# Patient Record
Sex: FEMALE | Race: BLACK OR AFRICAN AMERICAN | NOT HISPANIC OR LATINO | Employment: PART TIME | URBAN - METROPOLITAN AREA
[De-identification: names, ages, dates, MRNs, and addresses within clinical notes are randomized per-mention and may not be internally consistent; named-entity substitution may affect disease eponyms.]

---

## 2024-09-26 ENCOUNTER — PRE VISIT (OUTPATIENT)
Dept: RADIATION ONCOLOGY | Facility: CLINIC | Age: 44
End: 2024-09-26
Payer: COMMERCIAL

## 2024-09-26 ENCOUNTER — TRANSFERRED RECORDS (OUTPATIENT)
Dept: HEALTH INFORMATION MANAGEMENT | Facility: CLINIC | Age: 44
End: 2024-09-26
Payer: COMMERCIAL

## 2024-09-26 NOTE — TELEPHONE ENCOUNTER
Action 2024 3:57 PM ABT   Action Taken Called [facility] [phone], [facility] for image request 24 MR Brain     MEDICAL RECORDS REQUEST   Radiation Oncology  [address] [facility] [place] [address]  Fax: [phone]        FUTURE VISIT INFORMATION                                                   Emilee Rehman, : 1980 scheduled for future visit at [facility] Radiation Oncology    RECORDS REQUESTED FOR VISIT                                                     BRAIN STATUS DETAILS   OFFICE NOTE from PCP     OFFICE NOTE from neuro oncologist     OFFICE NOTE from neuro/neuro surg Req -Maya Pardo   ED/HOSP ADMISSION NOTES     OPERATIVE/BIOPSY REPORTS     Angiogram Embolization Report/Audiogram Reports (Acoustic Neuroma Only)     MEDICATION LIST     LABS     PATHOLOGY REPORTS     ANYTHING RELATED TO DIAGNOSIS     IMAGING (NEED IMAGES & REPORT)     CT BRAIN/HEAD PACS [facility]:  24: MR & MRA Head/Brain   MRI BRAIN/HEAD     XRAYS     ULTRASOUND     PET     PREVIOUS RADIATION     WHAT HEALTHCARE FACILITY     RADIATION ONCOLOGIST NOTES     RADIATION TREATMENT SUMMARY NOTES      *[facility] and [facility] Radiation Oncology patients to [facility] with ATTN: ILEANA/Tyrese Dosi/Physics    *only [facility] patient's, use [facility]

## 2024-09-27 ENCOUNTER — TRANSCRIBE ORDERS (OUTPATIENT)
Dept: OTHER | Age: 44
End: 2024-09-27

## 2024-09-27 DIAGNOSIS — D32.9 MENINGIOMA (H): Primary | ICD-10-CM

## 2024-09-30 DIAGNOSIS — D32.9 MENINGIOMA (H): Primary | ICD-10-CM

## 2024-11-04 NOTE — PROGRESS NOTES
Department of Radiation Oncology  [address] [address]  [phone]       Consultation Note    Name: Emilee Rehman MRN: [id number]   : 1980   Date of Service: 2024 Referring: Dr. Pardo      Diagnosis: Likely meningioma, presumed WHO G1  Stage: 0    History of Present Illness   Ms. Rehman is a 44 year old female with CNVI palsy of the L eye in 2024, found to have a 1.2 x 1.6 x 0.8 cm enhancing mass involving the left cavernous sinus which encases the left cavernous ICA segment. The overall imaging appearance is most suggestive of a meningioma. This probably represents the cause of the patient's left abducens nerve palsy. On 24, she met with neurosurgery Dr. Pardo who recommended a stereotatic radiosurgery with Gamma-Knife to halt the growth of the tumor and prevent any further cranial nerve palsies. Pt is in agreement with this plan.     Oncologic History:  Chemotherapy History: None  Radiation History: None  Pregnant: No  Implanted Cardiac Devices: No  Autoimmune History: No    On interview, the patient is feeling fell overall. She has had CN VI palsy that is managed with prism. She is here today to discuss GK SRS.    Review of Systems   A 12-point review of systems was performed. Pertinent findings are noted in the HPI.    Physical Exam   ECOG Status: 0    VITALS: BP (!) 151/97   Pulse 84   SpO2 97%   GEN: Appears well, alert, oriented, and in NAD  NEURO: No focal deficits, CN 3-12 grossly intact except L CN VI, normal and symmetric motor and sensory exam in bilateral upper and lower extremities, normal gait  PSYCH: Appropriate mood and affect    Imaging/Path/Labs   Imaging:   HEAD MRI:   1.  There is a 1.2 x 1.6 x 0.8 cm enhancing mass involving the left cavernous sinus which encases the left cavernous ICA segment. The overall imaging appearance is most suggestive of a meningioma. This probably  represents the cause of the patient's left abducens nerve palsy.     HEAD MRA:   1.  There is no evidence of luminal narrowing involving the encased left cavernous ICA segment.     2.  There is an apparent 2 mm outpouching involving the left MCA bifurcation. It is unclear whether this represents a small saccular aneurysm or a focal area of vessel tortuosity. Head CTA may help in further evaluation.     Path: None    Labs: reviewed    Assessment    Ms. Rehman is a 44 year old female with a suspected meningioma, 1.2 x 1.6 x 0.8 cm enhancing mass involving the left cavernous sinus, to whom we recommend GK SRS with 13 gy in 1 fx, frame based approach, 50% isodose line, no GTV expansion.    Plan   I discussed the natural history of her disease, and available management options. I discussed the risks, benefits, alternatives, and logistics to radiation therapy in detail. I discussed the risks associated with brain RT, including (but not limited to) fatigue, headache, hair loss, skin reaction, radiation necrosis, neurologic deficits, and neurologic/cognitive decline. She understands the risks of radiation therapy may be severe and permanent.    The patient elected to proceed with radiation therapy treatment and informed consent was obtained. All questions answered.      Patient was seen and discussed with my attending physician, Dr. العراقي.    Sonya Curtis MD, MS PGY-3  Department of Radiation Oncology  [facility]  Phone: [phone]   meningioma to prevent further enlargement of the mass.  Patient understands that the expectation is that after treatment, the meningioma will remain the same size or slightly shrink as seen on subsequent MRI scans.  She understands that recovery of function of her left abducens nerve is unlikely after treatment.    We appreciate the opportunity to participate in 's care.  Please call with questions.    75 minutes spent by me on the date of the encounter doing chart review, history and exam, documentation and further activities per the note    Reema العراقي MD  Department of Radiation Oncology  [facility]    CC  Patient Care Team:  Clinic - [place] [facility] as PCP - Reema Ro MD as Assigned Cancer Care Provider  Nestor Pardo MD

## 2024-11-06 ENCOUNTER — OFFICE VISIT (OUTPATIENT)
Dept: RADIATION ONCOLOGY | Facility: CLINIC | Age: 44
End: 2024-11-06
Attending: RADIOLOGY
Payer: COMMERCIAL

## 2024-11-06 VITALS — SYSTOLIC BLOOD PRESSURE: 151 MMHG | HEART RATE: 84 BPM | DIASTOLIC BLOOD PRESSURE: 97 MMHG | OXYGEN SATURATION: 97 %

## 2024-11-06 DIAGNOSIS — D32.9 MENINGIOMA (H): Primary | ICD-10-CM

## 2024-11-06 LAB — HCG SERPL QL: NEGATIVE

## 2024-11-06 PROCEDURE — 99212 OFFICE O/P EST SF 10 MIN: CPT | Performed by: RADIOLOGY

## 2024-11-06 PROCEDURE — 84703 CHORIONIC GONADOTROPIN ASSAY: CPT | Performed by: RADIOLOGY

## 2024-11-06 RX ORDER — FERROUS SULFATE 325(65) MG
325 TABLET, DELAYED RELEASE (ENTERIC COATED) ORAL DAILY
COMMUNITY

## 2024-11-06 ASSESSMENT — ENCOUNTER SYMPTOMS
CARDIOVASCULAR NEGATIVE: 1
PSYCHIATRIC NEGATIVE: 1
GASTROINTESTINAL NEGATIVE: 1
MUSCULOSKELETAL NEGATIVE: 1
EYES NEGATIVE: 1
RESPIRATORY NEGATIVE: 1
CONSTITUTIONAL NEGATIVE: 1
NEUROLOGICAL NEGATIVE: 1

## 2024-11-06 NOTE — PROGRESS NOTES
HPI  Date: 2024   Age: 44 year old  Ethnicity:   Sex: female  : 1980   Lives In: [place], [place]      Diagnosis: Meningioma    Pain at time of consult? None  Does patient have a living will? Not yet  Is patient pregnant? No   Does patient have an implanted cardiac device? No     RN time with patient: 30 minutes  Educated on gamma knife? Yes     Fall Screen:  Have you fallen in the past week? No  Have you felt unsteady when walking or standing in the past week?  No     Physicians: Edvin/Malcom      Prior radiation therapy:   None    Prior chemotherapy:   None    Review Since Diagnosis:  Pt presented to [facility] Emergency Department for left eye that was deviating medially on 24.  Pt had been able to identify left eye issues since .  24 Brain MRI showing 1.2 x 1.6 x 0.8 cm enhancing mass involving the left cavernous sinus which encases the left cavernous ICA segment. The overall imaging appearance is most suggestive of a meningioma. This probably represents the cause of the patient's left abducens nerve palsy.   24 met with Dr. Pardo for further evaluation. Dr. Pardo recommended a stereotatic radiosurgery with Gamma-Knife to halt the growth of the tumor and prevent any further cranial nerve palsies. Pt is in agreement with this plan.      Chief Complaint: at     Review of Systems   Constitutional: Negative.    HENT: Negative.     Eyes: Negative.         Glasses help   Respiratory: Negative.     Cardiovascular: Negative.    Gastrointestinal: Negative.    Genitourinary: Negative.    Musculoskeletal: Negative.    Skin: Negative.    Neurological: Negative.    Endo/Heme/Allergies: Negative.    Psychiatric/Behavioral: Negative.

## 2024-11-06 NOTE — LETTER
2024      Emilee Rehman  [address]  [place] [address]      Dear Colleague,    Thank you for referring your patient, Emilee Rehman, to the [facility] RADIATION ONCOLOGY. Please see a copy of my visit note below.       Department of Radiation Oncology  [facility]  [address] [place] [address]  [phone]       Consultation Note    Name: Emilee Rehman MRN: [id number]   : 1980   Date of Service: 2024 Referring: Dr. Pardo      Diagnosis: Likely meningioma, presumed WHO G1  Stage: 0    History of Present Illness   Ms. Rehman is a 44 year old female with CNVI palsy of the L eye in 2024, found to have a 1.2 x 1.6 x 0.8 cm enhancing mass involving the left cavernous sinus which encases the left cavernous ICA segment. The overall imaging appearance is most suggestive of a meningioma. This probably represents the cause of the patient's left abducens nerve palsy. On 24, she met with neurosurgery Dr. Pardo who recommended a stereotatic radiosurgery with Gamma-Knife to halt the growth of the tumor and prevent any further cranial nerve palsies. Pt is in agreement with this plan.     Oncologic History:  Chemotherapy History: None  Radiation History: None  Pregnant: No  Implanted Cardiac Devices: No  Autoimmune History: No    On interview,***    Past Medical History:    Family History:    Review of Systems   A 12-point review of systems was performed. Pertinent findings are noted in the HPI.    Physical Exam   ECOG Status: {0-4:[id number]}    VITALS: There were no vitals taken for this visit.  GEN: Appears well, alert, oriented, and in NAD  HEENT: EOMI, normal conjunctiva, MMM  NECK: Supple, full ROM, no cervical or clavicular lymphadenopathy  CV: RRR, no murmurs/rubs/gallops, warm and well-perfused  RESP: CTAB, no wheezes/rales/rhonchi, breathing comfortably on room air  ABDOMEN: Soft, non-tender,  non-distended  : ***  SKIN: Normal color and turgor  NEURO: No focal deficits, CN 3-12 grossly intact, normal and symmetric motor and sensory exam in bilateral upper and lower extremities, normal gait  PSYCH: Appropriate mood and affect    Imaging/Path/Labs   Imaging:   HEAD MRI:   1.  There is a 1.2 x 1.6 x 0.8 cm enhancing mass involving the left cavernous sinus which encases the left cavernous ICA segment. The overall imaging appearance is most suggestive of a meningioma. This probably represents the cause of the patient's left abducens nerve palsy.     HEAD MRA:   1.  There is no evidence of luminal narrowing involving the encased left cavernous ICA segment.     2.  There is an apparent 2 mm outpouching involving the left MCA bifurcation. It is unclear whether this represents a small saccular aneurysm or a focal area of vessel tortuosity. Head CTA may help in further evaluation.     Path: None    Labs: reviewed    Assessment    Ms. Rehman is a 44 year old female with a suspected meningioma, 1.2 x 1.6 x 0.8 cm enhancing mass involving the left cavernous sinus, to whom we recommend GK SRS with 13 gy in 1 fx, frame based approach, 50% isodose line, no GTV expansion.    Plan   I discussed the natural history of her disease, and available management options. I discussed the risks, benefits, alternatives, and logistics to radiation therapy in detail. I discussed the risks associated with brain RT, including (but not limited to) fatigue, headache, hair loss, skin reaction, radiation necrosis, neurologic deficits, and neurologic/cognitive decline. She understands the risks of radiation therapy may be severe and permanent.    The patient elected to proceed with radiation therapy treatment and informed consent was obtained. All questions answered.      Patient was seen and discussed with my attending physician, Dr. العراقي.    Sonya Curtis MD, MS PGY-3  Department of Radiation Oncology  [facility],  [place]  Phone: [phone]      HPI  Date: 2024   Age: 44 year old  Ethnicity:   Sex: female  : 1980   Lives In: [place], [place]      Diagnosis: Meningioma    Pain at time of consult? None  Does patient have a living will? Not yet  Is patient pregnant? No   Does patient have an implanted cardiac device? No     RN time with patient: 30 minutes  Educated on gamma knife? Yes     Fall Screen:  Have you fallen in the past week? No  Have you felt unsteady when walking or standing in the past week?  No     Physicians: Edvin/Malcom      Prior radiation therapy:   None    Prior chemotherapy:   None    Review Since Diagnosis:  Pt presented to [facility] Emergency Department for left eye that was deviating medially on 24.  Pt had been able to identify left eye issues since .  24 Brain MRI showing 1.2 x 1.6 x 0.8 cm enhancing mass involving the left cavernous sinus which encases the left cavernous ICA segment. The overall imaging appearance is most suggestive of a meningioma. This probably represents the cause of the patient's left abducens nerve palsy.   24 met with Dr. Pardo for further evaluation. Dr. Pardo recommended a stereotatic radiosurgery with Gamma-Knife to halt the growth of the tumor and prevent any further cranial nerve palsies. Pt is in agreement with this plan.      Chief Complaint: at     Review of Systems   Constitutional: Negative.    HENT: Negative.     Eyes: Negative.         Glasses help   Respiratory: Negative.     Cardiovascular: Negative.    Gastrointestinal: Negative.    Genitourinary: Negative.    Musculoskeletal: Negative.    Skin: Negative.    Neurological: Negative.    Endo/Heme/Allergies: Negative.    Psychiatric/Behavioral: Negative.                   Again, thank you for allowing me to participate in the care of your patient.        Sincerely,        Reema العراقي MD

## 2024-11-13 ENCOUNTER — OFFICE VISIT (OUTPATIENT)
Dept: RADIATION ONCOLOGY | Facility: CLINIC | Age: 44
End: 2024-11-13
Attending: RADIOLOGY
Payer: COMMERCIAL

## 2024-11-13 ENCOUNTER — HOSPITAL ENCOUNTER (OUTPATIENT)
Dept: MRI IMAGING | Facility: CLINIC | Age: 44
Discharge: HOME | End: 2024-11-13
Attending: RADIOLOGY
Payer: COMMERCIAL

## 2024-11-13 VITALS
DIASTOLIC BLOOD PRESSURE: 85 MMHG | OXYGEN SATURATION: 98 % | HEART RATE: 79 BPM | SYSTOLIC BLOOD PRESSURE: 140 MMHG | RESPIRATION RATE: 16 BRPM

## 2024-11-13 DIAGNOSIS — D32.9 MENINGIOMA (H): Primary | ICD-10-CM

## 2024-11-13 DIAGNOSIS — D32.9 MENINGIOMA (H): ICD-10-CM

## 2024-11-13 PROCEDURE — 70552 MRI BRAIN STEM W/DYE: CPT

## 2024-11-13 PROCEDURE — 250N000009 HC RX 250: Performed by: RADIOLOGY

## 2024-11-13 PROCEDURE — 250N000013 HC RX MED GY IP 250 OP 250 PS 637: Performed by: RADIOLOGY

## 2024-11-13 PROCEDURE — 250N000011 HC RX IP 250 OP 636: Performed by: RADIOLOGY

## 2024-11-13 PROCEDURE — 77334 RADIATION TREATMENT AID(S): CPT | Performed by: RADIOLOGY

## 2024-11-13 PROCEDURE — A9585 GADOBUTROL INJECTION: HCPCS | Performed by: RADIOLOGY

## 2024-11-13 PROCEDURE — 77370 RADIATION PHYSICS CONSULT: CPT | Performed by: RADIOLOGY

## 2024-11-13 PROCEDURE — 77371 SRS MULTISOURCE: CPT | Performed by: RADIOLOGY

## 2024-11-13 PROCEDURE — 77300 RADIATION THERAPY DOSE PLAN: CPT | Performed by: RADIOLOGY

## 2024-11-13 PROCEDURE — 255N000002 HC RX 255 OP 636: Performed by: RADIOLOGY

## 2024-11-13 PROCEDURE — 77295 3-D RADIOTHERAPY PLAN: CPT | Performed by: RADIOLOGY

## 2024-11-13 RX ORDER — ACETAMINOPHEN 325 MG/1
650 TABLET ORAL EVERY 4 HOURS PRN
Status: DISCONTINUED | OUTPATIENT
Start: 2024-11-13 | End: 2024-11-13 | Stop reason: HOSPADM

## 2024-11-13 RX ORDER — HYDROCODONE BITARTRATE AND ACETAMINOPHEN 5; 325 MG/1; MG/1
1-2 TABLET ORAL EVERY 6 HOURS PRN
Status: DISCONTINUED | OUTPATIENT
Start: 2024-11-13 | End: 2024-11-13 | Stop reason: HOSPADM

## 2024-11-13 RX ORDER — LIDOCAINE 40 MG/G
1 CREAM TOPICAL SEE ADMIN INSTRUCTIONS
Status: COMPLETED | OUTPATIENT
Start: 2024-11-13 | End: 2024-11-13

## 2024-11-13 RX ORDER — ONDANSETRON 2 MG/ML
4 INJECTION INTRAMUSCULAR; INTRAVENOUS
Status: DISCONTINUED | OUTPATIENT
Start: 2024-11-13 | End: 2024-11-13 | Stop reason: HOSPADM

## 2024-11-13 RX ORDER — LORAZEPAM 0.5 MG/1
.5-1 TABLET ORAL
Status: DISCONTINUED | OUTPATIENT
Start: 2024-11-13 | End: 2024-11-13 | Stop reason: HOSPADM

## 2024-11-13 RX ORDER — ONDANSETRON 4 MG/1
8 TABLET, ORALLY DISINTEGRATING ORAL EVERY 6 HOURS PRN
Status: DISCONTINUED | OUTPATIENT
Start: 2024-11-13 | End: 2024-11-13 | Stop reason: HOSPADM

## 2024-11-13 RX ORDER — ACETAMINOPHEN AND CODEINE PHOSPHATE 300; 30 MG/1; MG/1
1 TABLET ORAL EVERY 4 HOURS PRN
Status: DISCONTINUED | OUTPATIENT
Start: 2024-11-13 | End: 2024-11-13 | Stop reason: HOSPADM

## 2024-11-13 RX ORDER — LORAZEPAM 0.5 MG/1
.5-2 TABLET ORAL
Status: COMPLETED | OUTPATIENT
Start: 2024-11-13 | End: 2024-11-13

## 2024-11-13 RX ORDER — GADOBUTROL 604.72 MG/ML
7.5 INJECTION INTRAVENOUS ONCE
Status: COMPLETED | OUTPATIENT
Start: 2024-11-13 | End: 2024-11-13

## 2024-11-13 RX ADMIN — GADOBUTROL 7.5 ML: 604.72 INJECTION INTRAVENOUS at 07:14

## 2024-11-13 RX ADMIN — LIDOCAINE 1 G: 40 CREAM TOPICAL at 05:16

## 2024-11-13 RX ADMIN — LIDOCAINE HYDROCHLORIDE,EPINEPHRINE BITARTRATE 30 ML: 20; .01 INJECTION, SOLUTION INFILTRATION; PERINEURAL at 05:17

## 2024-11-13 RX ADMIN — LORAZEPAM 1 MG: 0.5 TABLET ORAL at 05:16

## 2024-11-13 ASSESSMENT — PAIN SCALES - GENERAL: PAINLEVEL_OUTOF10: NO PAIN (0)

## 2024-11-13 NOTE — LETTER
2024      Emilee Rehman  [address]  [place] [address]      Dear Colleague,    Thank you for referring your patient, Emilee Rehman, to the [facility] RADIATION ONCOLOGY. Please see a copy of my visit note below.      Name: Emilee Rehman  : 1980   Medical Record #: [id number]  Diagnosis: D32.0 Benign neoplasm of cerebral meninges  Date of Treatment: 2024  Referring Physicians: Edvin, Tumor Registry    GAMMA KNIFE PROCEDURE NOTE and TREATMENT SUMMARY    Treatment Summary:     Treatment Site Dose Modality Collimators Shots   L cav sinus meningioma 13 Gy to 60% isodose Cobalt 60 4,8,16mm 30     DESCRIPTION OF PROCEDURE:  On 2024 the patient was brought to the Gamma Knife suite at the [facility].      HEAD IMMOBILIZATION: Head frame put on by the neurosurgeon  MEDICATION: Sedation and local anesthetic    The patient was then taken to the Department of Radiology where a stereotactic brain MRI was performed.  The patient was admitted to the  care area while treatment planning was completed.      These images were then sent to the [employer] Gamma Knife computer system where a three dimensional stereotactic plan was generated.   The patient was then treated on the [employer] Gamma Knife.  Treatment involved 1 target.    The [employer] Gamma Knife IconTM Plan software was used to create a highly conformal dose distribution using the number and size collimators detailed above.     TREATMENT:    The patient was brought to the Gamma Knife suite.  The patient was identified by 2 methods. A timeout was performed to confirm the correct patient and correct procedure. The site was identified by an MRI image and treatment planning software, which defined the treatment area.     A cone beam CT was done and compared to the MRI to look for frame motion  We evaluated the frame manually to ensure it was still secure.     The treatment  was delivered using the [employer] Gamma Knife IconTM without complication.  The head immobilization was removed and the patient was discharged home in stable condition.  The patient tolerated the treatment well and had no complications.    PAIN MANAGEMENT: None required    FOLLOW-UP PLANS:  Follow up: 3 Months      Imaging Before Follow Up: Brain MRI    Approved by:  Reema العراقي MD    PHYSICIST: Ron Cantu, PhD    Again, thank you for allowing me to participate in the care of your patient.        Sincerely,        Reema العراقي MD

## 2024-11-13 NOTE — PROCEDURES
PREOPERATIVE DIAGNOSIS:  Left cavernous sinus meningioma    POSTOPERATIVE DIAGNOSIS:  Same    OPERATIVE PROCEDURES:  1.  Gamma Knife radiosurgery to left cavernous sinus meningioma, complex  2.  Application of stereotactic head frame for stereotactic radiosurgery    SURGEON:  Nestor Pardo MD    ASSISTANT:  None    ANESTHESIA:  Local    INDICATIONS FOR THE PROCEDURE:  Ms. Emilee Rehman is a 44 year-old female who presented with a left cranial nerve six palsy.  Imaging revealed a left cavernous sinus meningioma.  Gamma Knife stereotactic radiosurgery was recommended to treat this complex lesion involving the cranial nerves in the cavernous sinus.  Risks, benefits, and alternatives were discussed with Ms. Rehman.  She requested to proceed.    DESCRIPTION OF THE PROCEDURE:  On the morning of the procedure, the patient was brought to the Gamma Knife radiosurgery area.  A pre-procedure pause was performed to confirm the identity and date of birth of the patient, as well as the procedure site.  The scalp was prepped with betadine and then anesthetized with a combination of marcaine, lidocaine, and epinephrine.  The [employer] G-frame was applied and the pins were fixed to hand tightness.  The patient tolerated the application of the frame well.    The patient was taken to the MRI scanner where an MRI with gadolinium contrast was obtained.  The patient returned from MRI.  The lesion was outlined on the planning software and we made a conformal dose outline for this lesion.  Dr. العراقي selected the radiation dose for the lesion.  Measurements were taken,  steps performed, and the patient was then brought into the treatment room.  Radiation was given according to the prescription.    The patient was then taken out of the unit and out of the treatment room.  The stereotactic frame was removed and a dressing was applied.  After observation, the patient was discharged.  Follow up arrangements will be made  for the patient.    I attest that I was present for the entirety of the case, including pre-procedure assessment, stereotactic head frame placement, treatment planning, treatment delivery, as well as frame removal at the end of the treatment.

## 2024-11-13 NOTE — PROGRESS NOTES
Name: Emilee Rehman  : 1980   Medical Record #: [id number]  Diagnosis: D32.0 Benign neoplasm of cerebral meninges  Date of Treatment: 2024  Referring Physicians: Edvin, Tumor Registry    GAMMA KNIFE PROCEDURE NOTE and TREATMENT SUMMARY    Treatment Summary:     Treatment Site Dose Modality Collimators Shots   L cav sinus meningioma 13 Gy to 60% isodose Cobalt 60 4,8,16mm 30     DESCRIPTION OF PROCEDURE:  On 2024 the patient was brought to the Gamma Knife suite at the [facility].      HEAD IMMOBILIZATION: Head frame put on by the neurosurgeon  MEDICATION: Sedation and local anesthetic    The patient was then taken to the Department of Radiology where a stereotactic brain MRI was performed.  The patient was admitted to the  care area while treatment planning was completed.      These images were then sent to the [employer] Gamma Knife computer system where a three dimensional stereotactic plan was generated.   The patient was then treated on the [employer] Gamma Knife.  Treatment involved 1 target.    The [employer] Gamma Knife IconTM Plan software was used to create a highly conformal dose distribution using the number and size collimators detailed above.     TREATMENT:    The patient was brought to the Gamma Knife suite.  The patient was identified by 2 methods. A timeout was performed to confirm the correct patient and correct procedure. The site was identified by an MRI image and treatment planning software, which defined the treatment area.     A cone beam CT was done and compared to the MRI to look for frame motion  We evaluated the frame manually to ensure it was still secure.     The treatment was delivered using the [employer] Gamma Knife IconTM without complication.  The head immobilization was removed and the patient was discharged home in stable condition.  The patient tolerated the treatment well and had no complications.    PAIN  MANAGEMENT: None required    FOLLOW-UP PLANS:  Follow up: 3 Months      Imaging Before Follow Up: Brain MRI    Approved by:  Reema العراقي MD    PHYSICIST: Ron Cantu, PhD

## 2024-11-14 ENCOUNTER — TELEPHONE (OUTPATIENT)
Dept: RADIATION ONCOLOGY | Facility: CLINIC | Age: 44
End: 2024-11-14
Payer: COMMERCIAL

## 2024-11-14 NOTE — TELEPHONE ENCOUNTER
Follow up phone call made to the patient to check status post gamma knife on 11/13. Patient states she is doing good. Had mild pin site pain last night but resolved on own. No pain today. Denies any other issues. Patient has our number to call with any needs.

## 2025-06-08 ENCOUNTER — HEALTH MAINTENANCE LETTER (OUTPATIENT)
Age: 45
End: 2025-06-08

## (undated) RX ORDER — LORAZEPAM 0.5 MG/1
TABLET ORAL
Status: DISPENSED
Start: 2024-11-13

## (undated) RX ORDER — LIDOCAINE 40 MG/G
CREAM TOPICAL
Status: DISPENSED
Start: 2024-11-13